# Patient Record
Sex: MALE | Race: BLACK OR AFRICAN AMERICAN | Employment: UNEMPLOYED | URBAN - METROPOLITAN AREA
[De-identification: names, ages, dates, MRNs, and addresses within clinical notes are randomized per-mention and may not be internally consistent; named-entity substitution may affect disease eponyms.]

---

## 2021-01-01 ENCOUNTER — HOSPITAL ENCOUNTER (EMERGENCY)
Age: 26
Discharge: HOME | End: 2021-01-01
Attending: EMERGENCY MEDICINE

## 2021-01-01 VITALS
SYSTOLIC BLOOD PRESSURE: 133 MMHG | HEART RATE: 82 BPM | DIASTOLIC BLOOD PRESSURE: 78 MMHG | RESPIRATION RATE: 16 BRPM | OXYGEN SATURATION: 99 % | TEMPERATURE: 98 F | WEIGHT: 272 LBS

## 2021-01-01 DIAGNOSIS — K12.2 UVULITIS: Primary | ICD-10-CM

## 2021-01-01 LAB — S PYO AG THROAT QL: NEGATIVE

## 2021-01-01 PROCEDURE — 6370000000 HC RX 637 (ALT 250 FOR IP): Performed by: EMERGENCY MEDICINE

## 2021-01-01 PROCEDURE — 87880 STREP A ASSAY W/OPTIC: CPT

## 2021-01-01 PROCEDURE — 99283 EMERGENCY DEPT VISIT LOW MDM: CPT

## 2021-01-01 PROCEDURE — 87081 CULTURE SCREEN ONLY: CPT

## 2021-01-01 RX ORDER — PREDNISONE 20 MG/1
60 TABLET ORAL ONCE
Status: COMPLETED | OUTPATIENT
Start: 2021-01-01 | End: 2021-01-01

## 2021-01-01 RX ORDER — PREDNISONE 20 MG/1
20 TABLET ORAL DAILY
Qty: 4 TABLET | Refills: 0 | Status: SHIPPED | OUTPATIENT
Start: 2021-01-01 | End: 2021-01-05

## 2021-01-01 RX ADMIN — PREDNISONE 60 MG: 20 TABLET ORAL at 09:43

## 2021-01-01 NOTE — ED TRIAGE NOTES
Woke up this morning with throat feeling swollen. Coughed up some phlegm and then vomited once. No fevers.

## 2021-01-01 NOTE — ED PROVIDER NOTES
[id number] [facility] Emergency Department      Pt Name: Dhaval Pino  MRN: [id number]  [place] 1995  Date of evaluation: 1/1/2021  Provider: Naty Ames MD  CHIEF COMPLAINT  Chief Complaint   Patient presents with    Pharyngitis     HPI  Dhaval Pino is a 23 y.o. male who presents because of sore throat. He notes that this morning when he woke up. He kind of coughed and it felt like there is something in the back of his throat. He denies any fever or chills. He has been able to drink liquids. He has not had this problem before. He denies any known exposures to illness or strep throat. REVIEW OF SYSTEMS:  No fever, no vomiting, no sob, no weakness Pertinent positives and negatives as per the HPI. All other review of systems reviewed and negative. Nursing notes reviewed. PAST MEDICAL HISTORY  History reviewed. No pertinent past medical history. SURGICAL HISTORY  History reviewed. No pertinent surgical history. MEDICATIONS:  No current facility-administered medications on file prior to encounter. No current outpatient medications on file prior to encounter. ALLERGIES  Patient has no known allergies. SOCIAL HISTORY:  Social History     Tobacco Use    Smoking status: Never Smoker    Smokeless tobacco: Never Used   Substance Use Topics    Alcohol use: Yes     Comment: rare    Drug use: Never     IMMUNIZATIONS:  Noncontributory    PHYSICAL EXAM  VITAL SIGNS:  Blood pressure 133/78, pulse 82, temperature 98 °F (36.7 °C), temperature source Oral, resp. rate 16, weight 272 lb (123.4 kg), SpO2 99 %.   Constitutional:  23 y.o. male nontoxic, tolerates secretions normally  HENT:  Atraumatic, mucous membranes moist, no trismus, uvula midline and is mildly edematous, throat appears minimally red, no tonsillar enlargement, voice is normal  Eyes:   Conjunctiva clear, no icterus  Neck:  Supple, trachea midline, no stridor, no adenopathy  Thorax & Lungs:  Respiratory effort normal, no murmur, clear  Abdomen:  Non distended, soft  Back:  No deformity  Extremities:  No cyanosis, no edema  Skin:  Warm, dry, no facial or extremity rash appreciated  Neurologic:  Alert, normal coordination, normal voice and speech    DIAGNOSTIC RESULTS:    Labs Reviewed   STREP SCREEN GROUP A THROAT    Narrative:     Performed at:  [facility]  [address]   Phone [phone]   CULTURE, BETA STREP CONFIRM PLATES     ED COURSE:    Medications administered:  Medications   predniSONE (DELTASONE) tablet 60 mg (has no administration in time range)     PROCEDURES:  None    CONSULTATIONS:  None    MEDICAL DECISION MAKING: Shant Tracy is a 23 y.o. male who presented with sorethroat. He has findings of mild uvulitis. The patient is adequately hydrated, clinically nontoxic, and does not have any findings that would suggest impending airway issues. Shant Tracy was given appropriate discharge instructions. Referral to follow up provider. Differential diagnosis:  Peritonsillar abscess, epiglottitis, retropharyngeal abscess, foreign body, Sunny's angina, dehydration, infectious mono, Strep, other  New Prescriptions    PREDNISONE (DELTASONE) 20 MG TABLET    Take 1 tablet by mouth daily for 4 days     FOLLOW UP:    [facility]  [address]    Schedule an appointment as soon as possible for a visit       [facility] Emergency Department  [facility] [address] [place] [facility] [address]  Go to   If symptoms worsen    FINAL IMPRESSION:    1. Uvulitis        (Please note that I used voice recognition software to generate this note.   Occasionally words are mistranscribed despite my efforts to edit errors.)        Stalin Caruso MD  01/01/21 [id number]

## 2021-01-03 LAB — S PYO THROAT QL CULT: NORMAL

## 2024-09-19 ENCOUNTER — OFFICE VISIT (OUTPATIENT)
Age: 29
End: 2024-09-19

## 2024-09-19 VITALS
OXYGEN SATURATION: 95 % | HEIGHT: 69 IN | SYSTOLIC BLOOD PRESSURE: 136 MMHG | DIASTOLIC BLOOD PRESSURE: 90 MMHG | WEIGHT: 246 LBS | TEMPERATURE: 98.3 F | BODY MASS INDEX: 36.43 KG/M2 | HEART RATE: 88 BPM

## 2024-09-19 DIAGNOSIS — L03.012 PARONYCHIA OF LEFT INDEX FINGER: Primary | ICD-10-CM

## 2024-09-19 RX ORDER — CEPHALEXIN 500 MG/1
500 CAPSULE ORAL 2 TIMES DAILY
Qty: 14 CAPSULE | Refills: 0 | Status: SHIPPED | OUTPATIENT
Start: 2024-09-19 | End: 2024-09-26

## 2024-09-19 ASSESSMENT — ENCOUNTER SYMPTOMS
DIARRHEA: 0
COUGH: 0
NAUSEA: 0
ABDOMINAL PAIN: 0
VOMITING: 0
CHEST TIGHTNESS: 0
SHORTNESS OF BREATH: 0
CONSTIPATION: 0

## 2024-09-28 ENCOUNTER — OFFICE VISIT (OUTPATIENT)
Age: 29
End: 2024-09-28

## 2024-09-28 DIAGNOSIS — L03.012 PARONYCHIA OF LEFT INDEX FINGER: Primary | ICD-10-CM

## 2024-09-28 RX ORDER — CEPHALEXIN 500 MG/1
500 CAPSULE ORAL 4 TIMES DAILY
Qty: 28 CAPSULE | Refills: 0 | Status: SHIPPED | OUTPATIENT
Start: 2024-09-28 | End: 2024-10-05